# Patient Record
Sex: MALE | Race: OTHER | HISPANIC OR LATINO | URBAN - METROPOLITAN AREA
[De-identification: names, ages, dates, MRNs, and addresses within clinical notes are randomized per-mention and may not be internally consistent; named-entity substitution may affect disease eponyms.]

---

## 2020-03-10 ENCOUNTER — EMERGENCY (EMERGENCY)
Facility: HOSPITAL | Age: 25
LOS: 0 days | Discharge: HOME | End: 2020-03-10
Attending: EMERGENCY MEDICINE
Payer: OTHER MISCELLANEOUS

## 2020-03-10 VITALS
RESPIRATION RATE: 18 BRPM | DIASTOLIC BLOOD PRESSURE: 89 MMHG | HEART RATE: 75 BPM | OXYGEN SATURATION: 98 % | SYSTOLIC BLOOD PRESSURE: 129 MMHG | TEMPERATURE: 98 F

## 2020-03-10 VITALS — HEIGHT: 69 IN | WEIGHT: 169.98 LBS

## 2020-03-10 PROCEDURE — 99282 EMERGENCY DEPT VISIT SF MDM: CPT

## 2020-03-10 PROCEDURE — 90471 IMMUNIZATION ADMIN: CPT

## 2020-03-10 PROCEDURE — 99283 EMERGENCY DEPT VISIT LOW MDM: CPT | Mod: 25

## 2020-03-10 PROCEDURE — 90715 TDAP VACCINE 7 YRS/> IM: CPT

## 2020-03-10 RX ORDER — CEPHALEXIN 500 MG
500 CAPSULE ORAL ONCE
Refills: 0 | Status: COMPLETED | OUTPATIENT
Start: 2020-03-10 | End: 2020-03-10

## 2020-03-10 RX ORDER — TETANUS TOXOID, REDUCED DIPHTHERIA TOXOID AND ACELLULAR PERTUSSIS VACCINE, ADSORBED 5; 2.5; 8; 8; 2.5 [IU]/.5ML; [IU]/.5ML; UG/.5ML; UG/.5ML; UG/.5ML
0.5 SUSPENSION INTRAMUSCULAR ONCE
Refills: 0 | Status: COMPLETED | OUTPATIENT
Start: 2020-03-10 | End: 2020-03-10

## 2020-03-10 RX ORDER — CEPHALEXIN 500 MG
1 CAPSULE ORAL
Qty: 21 | Refills: 0
Start: 2020-03-10 | End: 2020-03-16

## 2020-03-10 RX ADMIN — Medication 500 MILLIGRAM(S): at 21:29

## 2020-03-10 RX ADMIN — TETANUS TOXOID, REDUCED DIPHTHERIA TOXOID AND ACELLULAR PERTUSSIS VACCINE, ADSORBED 0.5 MILLILITER(S): 5; 2.5; 8; 8; 2.5 SUSPENSION INTRAMUSCULAR at 21:29

## 2020-03-10 NOTE — ED ADULT NURSE NOTE - CAS TRG GENERAL NORM CIRC DET
Strong peripheral pulses
Patient did have ischemic KEG changes in setting of hypotension after prolonged asystole , possible due to demand ischemia doubt ACS ?? Normal ventricular systolic function ,  would give ecotrin rectally

## 2020-03-10 NOTE — ED ADULT NURSE NOTE - CHPI ED NUR SYMPTOMS NEG
no vomiting/no blood in mucus/no purulent drainage/no rectal pain/no redness/no fever/no pain/no drainage/no chills/no bleeding at site

## 2020-03-10 NOTE — ED STATDOCS - CARE PLAN
Principal Discharge DX:	Laceration of left thumb with damage to nail, foreign body presence unspecified, initial encounter

## 2020-03-10 NOTE — ED STATDOCS - PATIENT PORTAL LINK FT
You can access the FollowMyHealth Patient Portal offered by [facility] by registering at the following website: [email or web address]. By joining [employer]’s FollowMyHealth portal, you will also be able to view your health information using other applications (apps) compatible with our system.

## 2020-03-10 NOTE — ED STATDOCS - NSFOLLOWUPINSTRUCTIONS_ED_ALL_ED_FT
Laceration    WHAT YOU NEED TO KNOW:    A laceration is an injury to the skin and the soft tissue underneath it. Lacerations happen when you are cut or hit by something. They can happen anywhere on the body.     DISCHARGE INSTRUCTIONS:    Return to the emergency department if:     You have heavy bleeding or bleeding that does not stop after 10 minutes of holding firm, direct pressure over the wound.       Your wound opens up.     Contact your healthcare provider if:     You have a fever or chills.       Your laceration is red, warm, or swollen.      You have red streaks on your skin coming from your wound.      You have white or yellow drainage from the wound that smells bad.      You have pain that gets worse, even after treatment.       You have questions or concerns about your condition or care.     Medicines:     Prescription pain medicine may be given. Ask how to take this medicine safely.       Antibiotics help treat or prevent a bacterial infection.       Take your medicine as directed. Contact your healthcare provider if you think your medicine is not helping or if you have side effects. Tell him or her if you are allergic to any medicine. Keep a list of the medicines, vitamins, and herbs you take. Include the amounts, and when and why you take them. Bring the list or the pill bottles to follow-up visits. Carry your medicine list with you in case of an emergency.    Care for your wound as directed:     Do not get your wound wet until your healthcare provider says it is okay. Do not soak your wound in water. Do not go swimming until your healthcare provider says it is okay. Carefully wash the wound with soap and water. Gently pat the area dry or allow it to air dry.       Change your bandages when they get wet, dirty, or after washing. Apply new, clean bandages as directed. Do not apply elastic bandages or tape too tight. Do not put powders or lotions over your incision.       Apply antibiotic ointment as directed. Your healthcare provider may give you antibiotic ointment to put over your wound if you have stitches.      Check your wound every day for signs of infection such as swelling, redness, or pus.     Self-care:     Apply ice on your wound for 15 to 20 minutes every hour or as directed. Use an ice pack, or put crushed ice in a plastic bag. Cover it with a towel. Ice helps prevent tissue damage and decreases swelling and pain.      Follow up with your healthcare provider as directed: You may need to follow up in 24 to 48 hours to have your wound checked for infection. You will need to return in 3 to 14 days if you have stitches or staples so they can be removed. Care for your wound as directed to prevent infection and help it heal. Write down your questions so you remember to ask them during your visits.

## 2020-03-10 NOTE — ED STATDOCS - ATTENDING CONTRIBUTION TO CARE
I, Manuel Valadez, performed the initial face to face bedside interview with this patient regarding history of present illness, review of symptoms and relevant past medical, social and family history.  I completed an independent physical examination.  I was the initial provider who evaluated this patient. I have signed out the follow up of any pending tests (i.e. labs, radiological studies) to the ACP.  I have communicated the patient’s plan of care and disposition with the ACP.  The history, relevant review of systems, past medical and surgical history, medical decision making, and physical examination was documented by the scribe in my presence and I attest to the accuracy of the documentation.

## 2020-03-10 NOTE — ED ADULT NURSE NOTE - OBJECTIVE STATEMENT
23 y/o male with no significant PMHx presents to the ED c/o laceration left thumb. As per friend at bedside, pt was at work yesterday when he accidentally cut himself with a knife at 3pm. Last Tdap unknown. No other complaints at this time

## 2020-03-10 NOTE — ED STATDOCS - SKIN, MLM
skin normal color for race, warm, dry. 2.5cm C shaped laceration to dorsal side of left thumb through nailbed.

## 2020-03-10 NOTE — ED STATDOCS - PROGRESS NOTE DETAILS
23 yo male presents with no significant PMH presents with L to the L thumb s/p cutting himself while at work around 3pm. Pt did not come in sooner because he was afraid to cause problems at work. Last tdap unknown. Laceration is over 24 hours old that involved part of the nail. No active bleeding. Will clean out wound, not close the wound but apply bacitracin and wrap the wound. Will treat prophylactically with oral abx. -Michael Solano PA-C Wound irrigated with NS and betadine. Applied bacitracin and wrapped with gauze. Pt to be d/c home. Strict return precautions given. -Michael Solano PA-C

## 2020-03-10 NOTE — ED ADULT TRIAGE NOTE - CHIEF COMPLAINT QUOTE
pt c/o laceration to the L. thumb while at work yesterday. unknown tetanus status. denies pain but reports numbness to the L. thumb

## 2020-03-12 DIAGNOSIS — S61.112A LACERATION WITHOUT FOREIGN BODY OF LEFT THUMB WITH DAMAGE TO NAIL, INITIAL ENCOUNTER: ICD-10-CM
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
## 2020-03-12 DIAGNOSIS — Z23 ENCOUNTER FOR IMMUNIZATION: ICD-10-CM
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
## 2023-04-12 ENCOUNTER — EMERGENCY (EMERGENCY)
Facility: HOSPITAL | Age: 28
LOS: 0 days | Discharge: HOME | End: 2023-04-13
Attending: STUDENT IN AN ORGANIZED HEALTH CARE EDUCATION/TRAINING PROGRAM
Payer: MEDICAID

## 2023-04-12 ENCOUNTER — EMERGENCY (EMERGENCY)
Facility: HOSPITAL | Age: 28
LOS: 0 days | Discharge: HOME | End: 2023-04-12
Attending: EMERGENCY MEDICINE
Payer: MEDICAID

## 2023-04-12 VITALS
DIASTOLIC BLOOD PRESSURE: 89 MMHG | SYSTOLIC BLOOD PRESSURE: 137 MMHG | TEMPERATURE: 98 F | RESPIRATION RATE: 16 BRPM | OXYGEN SATURATION: 97 % | HEART RATE: 100 BPM

## 2023-04-12 VITALS — HEIGHT: 66 IN | WEIGHT: 149.91 LBS

## 2023-04-12 VITALS — WEIGHT: 149.91 LBS | HEIGHT: 66 IN
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
## 2023-04-12 DIAGNOSIS — R10.813 RIGHT LOWER QUADRANT ABDOMINAL TENDERNESS: ICD-10-CM

## 2023-04-12 DIAGNOSIS — F10.10 ALCOHOL ABUSE, UNCOMPLICATED: ICD-10-CM

## 2023-04-12 DIAGNOSIS — R10.814 LEFT LOWER QUADRANT ABDOMINAL TENDERNESS: ICD-10-CM
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
## 2023-04-12 DIAGNOSIS — S22.42XA MULTIPLE FRACTURES OF RIBS, LEFT SIDE, INITIAL ENCOUNTER FOR CLOSED FRACTURE: ICD-10-CM

## 2023-04-12 DIAGNOSIS — R07.81 PLEURODYNIA: ICD-10-CM

## 2023-04-12 DIAGNOSIS — R06.6 HICCOUGH: ICD-10-CM

## 2023-04-12 LAB
ADD ON TEST-SPECIMEN IN LAB: SIGNIFICANT CHANGE UP
ADD ON TEST-SPECIMEN IN LAB: SIGNIFICANT CHANGE UP
ALBUMIN SERPL ELPH-MCNC: 3.9 G/DL — SIGNIFICANT CHANGE UP (ref 3.3–5)
ALP SERPL-CCNC: 68 U/L — SIGNIFICANT CHANGE UP (ref 40–120)
ALT FLD-CCNC: 36 U/L — SIGNIFICANT CHANGE UP (ref 12–78)
ANION GAP SERPL CALC-SCNC: 5 MMOL/L — SIGNIFICANT CHANGE UP (ref 5–17)
APPEARANCE UR: CLEAR — SIGNIFICANT CHANGE UP
AST SERPL-CCNC: 24 U/L — SIGNIFICANT CHANGE UP (ref 15–37)
BASOPHILS # BLD AUTO: 0.06 K/UL — SIGNIFICANT CHANGE UP (ref 0–0.2)
BASOPHILS NFR BLD AUTO: 0.6 % — SIGNIFICANT CHANGE UP (ref 0–2)
BILIRUB SERPL-MCNC: 0.5 MG/DL — SIGNIFICANT CHANGE UP (ref 0.2–1.2)
BILIRUB UR-MCNC: NEGATIVE — SIGNIFICANT CHANGE UP
BUN SERPL-MCNC: 4 MG/DL — LOW (ref 7–23)
CALCIUM SERPL-MCNC: 9.5 MG/DL — SIGNIFICANT CHANGE UP (ref 8.5–10.1)
CHLORIDE SERPL-SCNC: 104 MMOL/L — SIGNIFICANT CHANGE UP (ref 96–108)
CO2 SERPL-SCNC: 28 MMOL/L — SIGNIFICANT CHANGE UP (ref 22–31)
COLOR SPEC: YELLOW — SIGNIFICANT CHANGE UP
CREAT SERPL-MCNC: 0.56 MG/DL — SIGNIFICANT CHANGE UP (ref 0.5–1.3)
DIFF PNL FLD: NEGATIVE — SIGNIFICANT CHANGE UP
EGFR: 137 ML/MIN/1.73M2 — SIGNIFICANT CHANGE UP
EOSINOPHIL # BLD AUTO: 0.15 K/UL — SIGNIFICANT CHANGE UP (ref 0–0.5)
EOSINOPHIL NFR BLD AUTO: 1.5 % — SIGNIFICANT CHANGE UP (ref 0–6)
ETHANOL SERPL-MCNC: <10 MG/DL — SIGNIFICANT CHANGE UP (ref 0–10)
GLUCOSE SERPL-MCNC: 95 MG/DL — SIGNIFICANT CHANGE UP (ref 70–99)
GLUCOSE UR QL: NEGATIVE — SIGNIFICANT CHANGE UP
HCT VFR BLD CALC: 46.4 % — SIGNIFICANT CHANGE UP (ref 39–50)
HGB BLD-MCNC: 16.4 G/DL — SIGNIFICANT CHANGE UP (ref 13–17)
IMM GRANULOCYTES NFR BLD AUTO: 0.3 % — SIGNIFICANT CHANGE UP (ref 0–0.9)
KETONES UR-MCNC: NEGATIVE — SIGNIFICANT CHANGE UP
LEUKOCYTE ESTERASE UR-ACNC: NEGATIVE — SIGNIFICANT CHANGE UP
LYMPHOCYTES # BLD AUTO: 1.08 K/UL — SIGNIFICANT CHANGE UP (ref 1–3.3)
LYMPHOCYTES # BLD AUTO: 10.9 % — LOW (ref 13–44)
MAGNESIUM SERPL-MCNC: 2.4 MG/DL — SIGNIFICANT CHANGE UP (ref 1.6–2.6)
MCHC RBC-ENTMCNC: 33.1 PG — SIGNIFICANT CHANGE UP (ref 27–34)
MCHC RBC-ENTMCNC: 35.3 GM/DL — SIGNIFICANT CHANGE UP (ref 32–36)
MCV RBC AUTO: 93.7 FL — SIGNIFICANT CHANGE UP (ref 80–100)
MONOCYTES # BLD AUTO: 0.85 K/UL — SIGNIFICANT CHANGE UP (ref 0–0.9)
MONOCYTES NFR BLD AUTO: 8.6 % — SIGNIFICANT CHANGE UP (ref 2–14)
NEUTROPHILS # BLD AUTO: 7.77 K/UL — HIGH (ref 1.8–7.4)
NEUTROPHILS NFR BLD AUTO: 78.1 % — HIGH (ref 43–77)
NITRITE UR-MCNC: NEGATIVE — SIGNIFICANT CHANGE UP
PH UR: 7 — SIGNIFICANT CHANGE UP (ref 5–8)
PLATELET # BLD AUTO: 290 K/UL — SIGNIFICANT CHANGE UP (ref 150–400)
POTASSIUM SERPL-MCNC: 3.8 MMOL/L — SIGNIFICANT CHANGE UP (ref 3.5–5.3)
POTASSIUM SERPL-SCNC: 3.8 MMOL/L — SIGNIFICANT CHANGE UP (ref 3.5–5.3)
PROT SERPL-MCNC: 8.2 GM/DL — SIGNIFICANT CHANGE UP (ref 6–8.3)
PROT UR-MCNC: NEGATIVE — SIGNIFICANT CHANGE UP
RBC # BLD: 4.95 M/UL — SIGNIFICANT CHANGE UP (ref 4.2–5.8)
RBC # FLD: 12.3 % — SIGNIFICANT CHANGE UP (ref 10.3–14.5)
SODIUM SERPL-SCNC: 137 MMOL/L — SIGNIFICANT CHANGE UP (ref 135–145)
SP GR SPEC: 1.01 — SIGNIFICANT CHANGE UP (ref 1.01–1.02)
UROBILINOGEN FLD QL: NEGATIVE — SIGNIFICANT CHANGE UP
WBC # BLD: 9.94 K/UL — SIGNIFICANT CHANGE UP (ref 3.8–10.5)
WBC # FLD AUTO: 9.94 K/UL — SIGNIFICANT CHANGE UP (ref 3.8–10.5)

## 2023-04-12 PROCEDURE — 74177 CT ABD & PELVIS W/CONTRAST: CPT | Mod: 26,MA

## 2023-04-12 PROCEDURE — 99285 EMERGENCY DEPT VISIT HI MDM: CPT | Mod: 25

## 2023-04-12 PROCEDURE — 86901 BLOOD TYPING SEROLOGIC RH(D): CPT

## 2023-04-12 PROCEDURE — 96374 THER/PROPH/DIAG INJ IV PUSH: CPT | Mod: XU

## 2023-04-12 PROCEDURE — 71045 X-RAY EXAM CHEST 1 VIEW: CPT | Mod: 26

## 2023-04-12 PROCEDURE — 74177 CT ABD & PELVIS W/CONTRAST: CPT | Mod: MA

## 2023-04-12 PROCEDURE — 81003 URINALYSIS AUTO W/O SCOPE: CPT

## 2023-04-12 PROCEDURE — 85025 COMPLETE CBC W/AUTO DIFF WBC: CPT

## 2023-04-12 PROCEDURE — 99408 AUDIT/DAST 15-30 MIN: CPT

## 2023-04-12 PROCEDURE — 80053 COMPREHEN METABOLIC PANEL: CPT

## 2023-04-12 PROCEDURE — 86850 RBC ANTIBODY SCREEN: CPT

## 2023-04-12 PROCEDURE — 93010 ELECTROCARDIOGRAM REPORT: CPT

## 2023-04-12 PROCEDURE — 71045 X-RAY EXAM CHEST 1 VIEW: CPT

## 2023-04-12 PROCEDURE — 71260 CT THORAX DX C+: CPT | Mod: 26,MA

## 2023-04-12 PROCEDURE — 99285 EMERGENCY DEPT VISIT HI MDM: CPT

## 2023-04-12 PROCEDURE — 84484 ASSAY OF TROPONIN QUANT: CPT

## 2023-04-12 PROCEDURE — 93005 ELECTROCARDIOGRAM TRACING: CPT

## 2023-04-12 PROCEDURE — 86900 BLOOD TYPING SEROLOGIC ABO: CPT

## 2023-04-12 PROCEDURE — 71260 CT THORAX DX C+: CPT | Mod: MA

## 2023-04-12 PROCEDURE — 96375 TX/PRO/DX INJ NEW DRUG ADDON: CPT | Mod: XU

## 2023-04-12 PROCEDURE — 83690 ASSAY OF LIPASE: CPT

## 2023-04-12 PROCEDURE — 83735 ASSAY OF MAGNESIUM: CPT

## 2023-04-12 PROCEDURE — 80307 DRUG TEST PRSMV CHEM ANLYZR: CPT

## 2023-04-12 RX ORDER — ONDANSETRON 8 MG/1
4 TABLET, FILM COATED ORAL ONCE
Refills: 0 | Status: COMPLETED | OUTPATIENT
Start: 2023-04-12 | End: 2023-04-12

## 2023-04-12 RX ORDER — FAMOTIDINE 10 MG/ML
20 INJECTION INTRAVENOUS ONCE
Refills: 0 | Status: COMPLETED | OUTPATIENT
Start: 2023-04-12 | End: 2023-04-12

## 2023-04-12 RX ORDER — SODIUM CHLORIDE 9 MG/ML
1000 INJECTION INTRAMUSCULAR; INTRAVENOUS; SUBCUTANEOUS ONCE
Refills: 0 | Status: COMPLETED | OUTPATIENT
Start: 2023-04-12 | End: 2023-04-12

## 2023-04-12 RX ADMIN — SODIUM CHLORIDE 1000 MILLILITER(S): 9 INJECTION INTRAMUSCULAR; INTRAVENOUS; SUBCUTANEOUS at 15:38

## 2023-04-12 RX ADMIN — ONDANSETRON 4 MILLIGRAM(S): 8 TABLET, FILM COATED ORAL at 15:23

## 2023-04-12 RX ADMIN — SODIUM CHLORIDE 1000 MILLILITER(S): 9 INJECTION INTRAMUSCULAR; INTRAVENOUS; SUBCUTANEOUS at 15:20

## 2023-04-12 RX ADMIN — FAMOTIDINE 20 MILLIGRAM(S): 10 INJECTION INTRAVENOUS at 15:23

## 2023-04-12 RX ADMIN — Medication 1 MILLIGRAM(S): at 13:47

## 2023-04-12 NOTE — ED PROVIDER NOTE - OBJECTIVE STATEMENT
29 y/o male PMHx daily EtOH abuse, drinks 10-12 beers daily for 6 years presents to the ED c/o constant left rib pain x5 days also with abdominal tenderness. No trauma or falls. +hiccups "for a while." Last EtOH use yesterday (20 beers). Pt reports he wants to quit drinking. Pt has never been to rehab or been treated for EtOH abuse. No chest pain, no SOB. No other complaints at this time. 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

## 2023-04-12 NOTE — ED PROVIDER NOTE - NSFOLLOWUPINSTRUCTIONS_ED_ALL_ED_FT
Fractura de colin  Rib Fracture    Dorene fractura de colin es la ruptura de faiza de los huesos que la shital. Las costillas son huesos largos y curvos que rodean el pecho y están unidos a la columna vertebral y al esternón. Protegen al corazón, los pulmones y otros órganos que se encuentran en el pecho.    Dorene fractura o fisura de colin puede ser dolorosa, ami no suele ser grave. La mayoría de las fracturas de costillas se curan solas luego de un tiempo. Sin embargo, pueden llegar a ser más graves si se rompen varias costillas o si se desplazan y empujan otras estructuras u órganos.    ¿Cuáles son las causas?  Las causas de esta afección son:  Los movimientos repetitivos que requieren mucha fuerza, frank lanzar dorene pelota en el béisbol o tener episodios de tos muy sunita.  Un golpe directo en el pecho, frank dorene lesión deportiva, un accidente automovilístico o dorene caída.  Cáncer que se extendió a los huesos, lo que puede debilitarlos y provocarles fracturas.  ¿Cuáles son los signos o síntomas?  Los síntomas de esta afección incluyen:  Dolor al inspirar o al toser.  Dolor cuando alguien presiona la favian lesionada.  Falta de aire.  ¿Cómo se diagnostica?  Esta afección se diagnostica mediante un examen físico y los antecedentes médicos. Además, pueden hacerle estudios de diagnóstico por imágenes, por ejemplo:  Radiografía de tórax.  Exploración por tomografía computarizada (TC).  Resonancia magnética (RM).  Gammagrafía ósea.  Ecografía de tórax.  ¿Cómo se trata?  El tratamiento de esta afección depende de la gravedad de la fractura. La mayoría de las fracturas de costillas se curan solas en 1 a 3 meses. La curación puede tardar más tiempo si tiene tos o si realiza actividades que empeoran la lesión. Le podrán carlo analgésicos para controlar el dolor sridevi el proceso de curación. Le enseñarán a realizar ejercicios de respiración profunda.    En el serafin de las lesiones graves, es posible que deba ser hospitalizado o someterse a dorene cirugía.    Siga estas instrucciones en sarabia casa:  Control del dolor, la rigidez y la hinchazón    Si se lo indican, aplique hielo sobre la favian de la lesión. Para hacer esto:  Ponga el hielo en dorene bolsa plástica.  Coloque dorene toalla entre la piel y la bolsa.  Aplique el hielo sridevi 20 minutos, 2 o 3 veces por día.  Retire el hielo si la piel se pone de color schmid brillante. Gully es muy importante. Si no puede sentir dolor, calor o frío, tiene un mayor riesgo de que se dañe la favian.  Use los medicamentos de venta serafin y los recetados solamente frank se lo haya indicado el médico.  Actividad    Evitar realizar actividades o movimientos que causen dolor. Realice las actividades con cuidado y evite golpearse la colin lesionada.  Aumente la cantidad de actividades que realice de forma gradual frank se lo haya indicado el médico.  Instrucciones generales    Rebecca ejercicios de respiración profunda frank se lo haya indicado el médico. Gully ayudará a evitar la neumonía, que es dorene complicación frecuente de las fracturas de colin. El médico puede indicarle que rebecca lo siguiente:  Rebecca respiraciones profundas varias veces al día.  Trate de toser varias veces al día, con el área lesionada sostenida con dorene almohada.  Use un dispositivo llamado espirómetro de incentivo para realizar respiraciones profundas varias veces por día.  Beber suficiente líquido frank para mantener la orina de color amarillo pálido.  No use cinturones ni sujetadores. Esta limitación de la respiración puede causar neumonía.  Cumpla con todas las visitas de seguimiento. Gully es importante.  Comuníquese con un médico si:  Tiene fiebre.  Solicite ayuda de inmediato si:  Tiene dificultad para respirar o le falta el aire.  Tiene tos permanente o expectora un esputo espeso o con davi.  Tiene náuseas, vómitos o dolor abdominal.  El dolor empeora y los medicamentos no hacen efecto.  Estos síntomas pueden representar un problema grave que constituye dorene emergencia. No espere a yordy si los síntomas desaparecen. Solicite atención médica de inmediato. Comuníquese con el servicio de emergencias de sarabia localidad (911 en los [place]). No conduzca por zenobia propios medios hasta el hospital.    Resumen  Dorene fractura de colin es la ruptura de faiza de los huesos que la shital.  Dorene fractura o fisura de colin puede ser dolorosa, ami no suele ser grave.  La mayoría de las fracturas de costillas se curan solas luego de un tiempo.  El tratamiento de esta afección depende de la gravedad de la fractura.  Evitar realizar cualquier actividad o movimiento que cause dolor.  Esta información no tiene frank fin reemplazar el consejo del médico. Asegúrese de hacerle al médico cualquier pregunta que tenga.

## 2023-04-12 NOTE — ED STATDOCS - PROGRESS NOTE DETAILS
Chapito Rodriguez for attending Dr. He: 27 y/o male presents to the ED c/o left rib pain x5 days. No trauma or falls. +hiccups "for a while." Denies SOB. Daily EtOH use. Last EtOH use yesterday (20 beers). No other complaints at this time.  ID#: [id number]. Will send pt to main ED for further evaluation.

## 2023-04-12 NOTE — ED STATDOCS - NS_ATTENDINGSCRIBE_ED_ALL_ED
I personally performed the service described in the documentation recorded by the scribe in my presence, and it accurately and completely records my words and actions.

## 2023-04-12 NOTE — ED PROVIDER NOTE - PHYSICAL EXAMINATION
General: AAOx3, NAD  HEENT: NCAT  Cardiac: Normal rate and rhythm, no murmurs, normal peripheral perfusion  Respiratory: Normal rate and effort. CTAB  GI: Soft, nondistended. + b/l lower abdominal TTP  Neuro: No focal deficits. GAO equally x4, sensation to light touch intact throughout  MSK: FROMx4, no peripheral edema. + L lateral chest wall TTP.  Skin: No rash

## 2023-04-12 NOTE — ED PROVIDER NOTE - CLINICAL SUMMARY MEDICAL DECISION MAKING FREE TEXT BOX
Pt presenting with atraumatic L chest wall pain and lower abdominal tenderness. Plan: EKG, cardiac workup, CT chest/ab/pel. Given heavy alcohol use, need to r/o trauma. Pt is also interested in detox/rehab. This can be readdressed when remainder of medical workup is complete. Pt presenting with atraumatic L chest wall pain and lower abdominal tenderness. Plan: EKG, cardiac workup, CT chest/ab/pel. Given heavy alcohol use, need to r/o trauma. Pt is also interested in detox/rehab. This can be readdressed when remainder of medical workup is complete.    5:16pm- received s/o from Dr. Davey pending CT scan- CT scans with multiple rib fractures; seen by trauma and cleared for dc/ home; incentive spirometer given; patient consulted by SBIRT and refused detox; cleared for dc at home at time; ambulatory in ED with steady gait; strict return precautions given.

## 2023-04-12 NOTE — ED PROVIDER NOTE - OBJECTIVE STATEMENT
: #[id number] - Deandre   pt is a 27 y/o M w/ a PMHx of ETOH abuse (approximately 10-20 beers daily) recently seen today in the ER and found to have 3 nondisplaced rib fractures, evaluated by Trauma surgery and was discharged home with instructions for multimodal pain control and follow-up. pt was instructed to come back to the hospital if his pain is not controlled and to come back for hospitalization. Pt states that he is having continued and worsening pain but only took 2 Tylenol and used some heat packs for the pain without improvement. Pt also states that he also has been having hiccups. pt endorses last drink at midnight last night. No h/o withdrawals requiring hospitalization.

## 2023-04-12 NOTE — ED PROVIDER NOTE - PHYSICAL EXAMINATION
PHYSICAL EXAM:  GENERAL: in NAD, Sitting comfortable in bed, in no respiratory distress  HEAD: Atraumatic, no eastman's sign, no periorbital ecchymosis   EYES: PERRL, EOMs intact b/l w/out deficits  ENMT: Moist membranes, no anterior/posterior, or supraclavicular LAD  CHEST/LUNG: CTAB no wheezes/rhonchi/rales  HEART: RRR no murmur/gallops/rubs  ABDOMEN: +BS, soft, NT, ND  EXTREMITIES: No LE edema, +2 radial pulses b/l  NERVOUS SYSTEM:  A&Ox4, No motor deficits or sensory deficits to b/l UEs  Heme/LYMPH: No ecchymosis or bruising or LAD  SKIN:  No new rashes or DTIs PHYSICAL EXAM:  GENERAL: in NAD, Sitting comfortable in bed, in no respiratory distress  HEAD: Atraumatic, no eastman's sign, no periorbital ecchymosis   EYES: PERRL, EOMs intact b/l w/out deficits  ENMT: Moist membranes, no anterior/posterior, or supraclavicular LAD  CHEST/LUNG: CTAB no wheezes/rhonchi/rales; +TTP of left lower lateral ribs but no flail chest  HEART: RRR no murmur/gallops/rubs  ABDOMEN: +BS, soft, NT, ND  EXTREMITIES: No LE edema, +2 radial pulses b/l  NERVOUS SYSTEM:  A&Ox4, No motor deficits or sensory deficits to b/l UEs  Heme/LYMPH: No ecchymosis or bruising or LAD  SKIN:  No new rashes or DTIs

## 2023-04-12 NOTE — SBIRT NOTE ADULT - NSSBIRTDRGPOSREINDET_GEN_A_CORE
Services provided via Telephonic SBIRT line, [phone],  [id number] used. Positive reinforcement provided given patient currently within healthy guidelines. Education materials reviewed and given to patient.

## 2023-04-12 NOTE — SBIRT NOTE ADULT - NSSBIRTBRIEFINTDET_GEN_A_CORE
Services provided via Telephonic SBIRT line, [phone],  [id number] used. Screening results were reviewed with the patient and patient was provided information about healthy guidelines and potential negative consequences associated with level of risk. Motivation and readiness to reduce or stop use was discussed and goals and activities to make changes were suggested/offered. Patient is refusing referral to treatment, states he cannot take off work. HC discussed potential withdrawal symptoms to be aware of, and encouraged him to return to the ED if he needs assistance.

## 2023-04-12 NOTE — ED ADULT TRIAGE NOTE - CHIEF COMPLAINT QUOTE
Pt presents to ER c/o left sided rib/flank pain and generalized abd pain. Onset of symptoms began 5 days PTA. Denies injury/fall/CP/SOB. Pt reports he thinks it is r/t drinking alcohol; last drink was last night

## 2023-04-12 NOTE — ED PROVIDER NOTE - PATIENT PORTAL LINK FT
You can access the FollowMyHealth Patient Portal offered by [facility] by registering at the following website: [email or web address]. By joining [employer]’s FollowMyHealth portal, you will also be able to view your health information using other applications (apps) compatible with our system.

## 2023-04-12 NOTE — ED PROVIDER NOTE - NS ED ROS FT
Constitutional: No fevers, chills, or sweats.  Cardiac: No chest pain, exertional dyspnea, orthopnea  Respiratory: No shortness of breath, no cough  GI: No N/V/D. +abdominal tenderness  Neuro: No headaches, no neck pain/stiffness, no numbness  Msk: +rib pain  All other systems reviewed and are negative unless otherwise stated in the HPI.

## 2023-04-12 NOTE — ED PROVIDER NOTE - NS ED ROS FT
Constitutional: Denies fevers & chills  HEENT: Denies Rhinorrhea & sore throat  Cardiac: Denies LE yelena a  Pulmonary: Denies Shortness of breath & Cough  Abdomen: Denies Abdominal pain, N/V, blood in stools, diarrhea   : Denies dysuria & hematuria.  Skin: Denies Rash or itching  Neuro: Denies new visual changes, confusion, headaches, and one sided paralysis

## 2023-04-12 NOTE — CONSULT NOTE ADULT - ASSESSMENT
A 28 year old male with nondisplaced rib fractures  No evidence of hemo- or pneumothorax  Patient breathing well on room air    Plan:  Multimodal analgesia, Tylenol, NSAID, Lidocaine patch, Oxycodone PRN  Incentive spirometry  Patient can be discharged home    Plan discussed with Dr Holguin

## 2023-04-12 NOTE — CONSULT NOTE ADULT - SUBJECTIVE AND OBJECTIVE BOX
A 28 year old male who was seen in the ED for upper abdominal and lower chest pain for the last 5 days. Patient claims that his pain started around 5 days ago, felt across the abdomen, sharp, increasing in severity over time. Patient also describes nausea and had 2 episodes of vomiting overnight which made his pain worse. He denies any history of trauma or exercise. No history of similar pain in the past. He denies any cough, sputum, shortness of breath or pain anywhere else in his body.    PMH: Neg  PSH: Neg  Medications: none  Allergies: NKDA  Social: Drinks every day 10-12 beers    Physical:   Afebrile, stable v/s  Alert, conscious, oriented  Not in pain or respiratory distress  No pallor, jaundice or cyanosis  Chest clear, equal air entry bilaterally. Tenderness to palpation over the left lower chest. No crepitations  Abdomen: Soft, not distended, no masses. Tenderness over the right upper abdomen more than left side.  Extremities: Normal                          16.4   9.94  )-----------( 290      ( 12 Apr 2023 13:29 )             46.4   04-12    137  |  104  |  4<L>  ----------------------------<  95  3.8   |  28  |  0.56    Ca    9.5      12 Apr 2023 13:29  Mg     2.4     04-12    TPro  8.2  /  Alb  3.9  /  TBili  0.5  /  DBili  x   /  AST  24  /  ALT  36  /  AlkPhos  68  04-12  < from: CT Abdomen and Pelvis w/ IV Cont (04.12.23 @ 14:58) >  1. Acute lateral left sixth, seventh, and eighth rib fractures.  2. No pneumothorax.  3. No findings suspicious for acute intra-abdominal visceral injury.    < end of copied text >

## 2023-04-12 NOTE — ED PROVIDER NOTE - PATIENT PORTAL LINK FT
You can access the FollowMyHealth Patient Portal offered by [facility] by registering at the following website: [email or web address]. By joining [employer]’s FollowMyHealth portal, you will also be able to view your health information using other applications (apps) compatible with our system.

## 2023-04-12 NOTE — ED PROVIDER NOTE - PROGRESS NOTE DETAILS
Patient was offered inpatient detox resources by social work but declined.  Patient will receive information on outpatient detoxification resources.  Patient has been cleared for discharge.  Manuel Valadez, DO

## 2023-04-12 NOTE — ED ADULT NURSE NOTE - OBJECTIVE STATEMENT
Pt is a 28 YOM complaining of rib pain and hiccups. Pt states "I drink 12 beers a day my ribs hurt and I cant stop hiccuping."  Pt is GCS 15, HEENT clear, PERRL, PMS x4, A & O x4. Pt reports no medical hx. 20 g IV inserted labs drawn medications given. Pt safety is maintained bed in lowest position semi fowlers with bed rails up.

## 2023-04-12 NOTE — ED PROVIDER NOTE - CLINICAL SUMMARY MEDICAL DECISION MAKING FREE TEXT BOX
Pt is a 27 y/o M w/ a PMHx of ETOH abuse (approximately 10-20 beers daily) recently seen today in the ER and found to have 3 nondisplaced rib fractures, evaluated by Trauma surgery and was discharged home with instructions for multimodal pain control and follow-up Pt is a 29 y/o M w/ a PMHx of ETOH abuse (approximately 10-20 beers daily) recently seen today in the ER and found to have 3 nondisplaced rib fractures, evaluated by Trauma surgery and was discharged home with instructions for multimodal pain control and follow-up who p/w worsening pain in the setting of non-adherence to pain control regimen previously cleared by trauma surgery for discharge. Pt also with mild withdrawal with slight tremulousness and tongue fasciculations. Will check CBC to ensure H/H stable, CMP to check kidney and liver function, give Librium Q6H and PRN ativan PO for withdrawal symptoms. Discussed with patient and mother at bedside (with  above) and patient desires an inpatient detox. Will check CXR and if no PTX will control pain with NSAIDs and lidocaine patch and treat hiccups with Thorazine and gabapentin and then will see SW in AM for transfer to in pt detox as patient with CIWA scores of 1-2 currently.

## 2023-04-12 NOTE — ED PROVIDER NOTE - NSFOLLOWUPINSTRUCTIONS_ED_ALL_ED_FT
Intoxicación alcohólica  Alcohol Intoxication    La intoxicación alcohólica ocurre cuando dorene persona ya no piensa con claridad ni se desempeña miguel (experimenta un deterioro) después de beber bebidas alcohólicas. La intoxicación alcohólica puede ocurrir incluso con dorene copa. El nivel de deterioro depende de lo siguiente:    La cantidad de alcohol que la persona tomó.  La edad, el sexo y el peso de la persona.  La frecuencia con la que la persona cornell.  Si la persona tiene otras enfermedades, tales frank diabetes, convulsiones o dorene afección cardíaca.    La intoxicación alcohólica puede variar en gravedad, desde leve hasta grave. La afección puede ser peligrosa, especialmente cuando se beben grandes cantidades de alcohol en un corto período de tiempo (borrachera) o si la persona también tomó medicamentos recetados o consumió drogas recreativas.    ¿Cuáles son los signos o los síntomas?  Los síntomas de intoxicación alcohólica leve incluyen los siguientes:    Sensación de relajación o somnolencia.  Leve dificultad con:  La coordinación.  El habla.  La memoria.  La atención.    Los síntomas de intoxicación alcohólica moderada incluyen los siguientes:    Emociones extremas, frank enojo o tristeza.  Dificultad moderada con:  La coordinación.  El habla.  La memoria.  La atención.    Los síntomas de intoxicación alcohólica grave incluyen los siguientes:    Desmayo.  Vómitos.  Confusión.  Respiración lenta.  Coma.  Dificultad grave con:  La coordinación.  El habla.  La memoria.  La atención.    La intoxicación alcohólica, especialmente en personas que no están expuestas al alcohol, con frecuencia puede avanzar de leve a grave rápidamente y hasta puede causar coma o la muerte.    ¿Cómo se diagnostica?  Esta afección se puede diagnosticar en función de lo siguiente:    Los antecedentes médicos.  Un examen físico.  Un análisis de davi que mide la concentración de alcohol en la davi (blood alcohol content, JORGE).  Si hay olor a alcohol en la respiración.    Sarabia médico le preguntará la cantidad y el tipo de bebida alcohólica que bebió.    ¿Cómo se trata?  Generalmente, no se requiere un tratamiento para esta afección. La mayoría de los efectos del alcohol son temporales y desaparecen a medida que el alcohol fauzia el cuerpo de forma natural. El médico puede recomendar un monitoreo hasta que el nivel de alcohol comience a bajar y sea seguro volver a casa. También es posible que le administren líquidos a través de dorene vía intravenosa (IV) para ayudar a evitar la deshidratación. Si la intoxicación alcohólica es grave, es posible que se necesite dorene máquina para respirar denominada "ventilador" para ayudarle a respirar.    Siga estas instrucciones en sarabia casa:  No conduzca vehículos después de beber alcohol.  Pídale a alguna persona que se quede con usted mientras está embriagado. No debe quedarse solo.  Manténgase hidratado. Va suficiente líquido para mantener la orina lu o de color amarillo pálido.  Evite la cafeína ya que puede deshidratarlo.  [place] los medicamentos de venta serafin y los recetados solamente frank se lo haya indicado el médico.     ¿Cómo se gladis?  Para evitar la intoxicación alcohólica:    Limite el consumo de alcohol a no más de 1 medida por día si es dinora y no está embarazada y a 2 medidas por día si es hombre. Dorene medida equivale a 12 onzas de cerveza, 5 onzas de vino o 1½ onzas de bebidas alcohólicas de anabella graduación.  No va alcohol con el estómago vacío.  Evite el consumo de alcohol si:  No tiene la edad legal para beber.  Está embarazada o podría estarlo.  Está tomando medicamentos que no se deben mikki con alcohol.  Beber empeora sarabia enfermedad.  Tiene que conducir o realizar actividades que requieren sarabia atención.  Tiene un trastorno por abuso de sustancias.    Para evitar las complicaciones posiblemente graves de la intoxicación alcohólica, busque atención médica de inmediato si usted o dorene persona que conoce tiene signos de intoxicación alcohólica moderada o grave. Estos incluyen los siguientes:    Dificultad moderada o grave con:  La coordinación.  El habla.  La memoria.  La atención.  Desmayo.  Confusión.  Vómitos.    No deje a dorene persona filomena si está embriagada.    Comuníquese con un médico si:  No mejora luego de algunos días.  Tiene problemas en el trabajo, la escuela o en sarabia casa debido al consumo de alcohol.    Solicite ayuda de inmediato si:  Se siente inestable cuando intenta abandonar el consumo de alcohol.  Comienza a temblar de manera incontrolable (tiene convulsiones).  Vomita davi. La davi en el vómito puede ser de color schmid brillante o similar a los granos de café.  Observa davi en la materia fecal. La davi en la materia fecal puede ser de color schmid brillante o hacer que la materia fecal se miracle de color nino alquitranado y huela mal.  Se siente mareado o se desmaya.    Si alguna vez siente que puede lastimarse o lastimar a los demás, o tiene pensamientos de poner fin a sarabia gabrielle, busque ayuda de inmediato. Puede dirigirse al servicio de urgencias más cercano o comunicarse con:    El servicio de emergencias de sarabia localidad (911 en los [place]).  Dorene línea de asistencia al suicida y atención en crisis, frank la Línea Nacional de Prevención del Suicidio (National Suicide Prevention Lifeline) al [phone]. Está disponible las 24 horas del día.

## 2023-04-13 VITALS
DIASTOLIC BLOOD PRESSURE: 68 MMHG | RESPIRATION RATE: 16 BRPM | SYSTOLIC BLOOD PRESSURE: 120 MMHG | TEMPERATURE: 98 F | OXYGEN SATURATION: 97 % | HEART RATE: 92 BPM

## 2023-04-13 DIAGNOSIS — F17.210 NICOTINE DEPENDENCE, CIGARETTES, UNCOMPLICATED: ICD-10-CM

## 2023-04-13 DIAGNOSIS — R07.81 PLEURODYNIA: ICD-10-CM

## 2023-04-13 DIAGNOSIS — S22.42XA MULTIPLE FRACTURES OF RIBS, LEFT SIDE, INITIAL ENCOUNTER FOR CLOSED FRACTURE: ICD-10-CM
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
## 2023-04-13 DIAGNOSIS — Z86.59 PERSONAL HISTORY OF OTHER MENTAL AND BEHAVIORAL DISORDERS: ICD-10-CM
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
## 2023-04-13 LAB
ALBUMIN SERPL ELPH-MCNC: 3.7 G/DL — SIGNIFICANT CHANGE UP (ref 3.3–5)
ALP SERPL-CCNC: 68 U/L — SIGNIFICANT CHANGE UP (ref 40–120)
ALT FLD-CCNC: 30 U/L — SIGNIFICANT CHANGE UP (ref 12–78)
ANION GAP SERPL CALC-SCNC: 2 MMOL/L — LOW (ref 5–17)
AST SERPL-CCNC: 19 U/L — SIGNIFICANT CHANGE UP (ref 15–37)
BASOPHILS # BLD AUTO: 0.09 K/UL — SIGNIFICANT CHANGE UP (ref 0–0.2)
BASOPHILS NFR BLD AUTO: 0.7 % — SIGNIFICANT CHANGE UP (ref 0–2)
BILIRUB SERPL-MCNC: 0.9 MG/DL — SIGNIFICANT CHANGE UP (ref 0.2–1.2)
BUN SERPL-MCNC: 5 MG/DL — LOW (ref 7–23)
CALCIUM SERPL-MCNC: 9.1 MG/DL — SIGNIFICANT CHANGE UP (ref 8.5–10.1)
CHLORIDE SERPL-SCNC: 104 MMOL/L — SIGNIFICANT CHANGE UP (ref 96–108)
CO2 SERPL-SCNC: 31 MMOL/L — SIGNIFICANT CHANGE UP (ref 22–31)
CREAT SERPL-MCNC: 0.64 MG/DL — SIGNIFICANT CHANGE UP (ref 0.5–1.3)
EGFR: 131 ML/MIN/1.73M2 — SIGNIFICANT CHANGE UP
EOSINOPHIL # BLD AUTO: 0.29 K/UL — SIGNIFICANT CHANGE UP (ref 0–0.5)
EOSINOPHIL NFR BLD AUTO: 2.2 % — SIGNIFICANT CHANGE UP (ref 0–6)
GLUCOSE SERPL-MCNC: 106 MG/DL — HIGH (ref 70–99)
HCT VFR BLD CALC: 45.3 % — SIGNIFICANT CHANGE UP (ref 39–50)
HGB BLD-MCNC: 15.8 G/DL — SIGNIFICANT CHANGE UP (ref 13–17)
IMM GRANULOCYTES NFR BLD AUTO: 0.2 % — SIGNIFICANT CHANGE UP (ref 0–0.9)
LYMPHOCYTES # BLD AUTO: 1.46 K/UL — SIGNIFICANT CHANGE UP (ref 1–3.3)
LYMPHOCYTES # BLD AUTO: 10.9 % — LOW (ref 13–44)
MCHC RBC-ENTMCNC: 33 PG — SIGNIFICANT CHANGE UP (ref 27–34)
MCHC RBC-ENTMCNC: 34.9 GM/DL — SIGNIFICANT CHANGE UP (ref 32–36)
MCV RBC AUTO: 94.6 FL — SIGNIFICANT CHANGE UP (ref 80–100)
MONOCYTES # BLD AUTO: 1.25 K/UL — HIGH (ref 0–0.9)
MONOCYTES NFR BLD AUTO: 9.3 % — SIGNIFICANT CHANGE UP (ref 2–14)
NEUTROPHILS # BLD AUTO: 10.33 K/UL — HIGH (ref 1.8–7.4)
NEUTROPHILS NFR BLD AUTO: 76.7 % — SIGNIFICANT CHANGE UP (ref 43–77)
PLATELET # BLD AUTO: 291 K/UL — SIGNIFICANT CHANGE UP (ref 150–400)
POTASSIUM SERPL-MCNC: 3.8 MMOL/L — SIGNIFICANT CHANGE UP (ref 3.5–5.3)
POTASSIUM SERPL-SCNC: 3.8 MMOL/L — SIGNIFICANT CHANGE UP (ref 3.5–5.3)
PROT SERPL-MCNC: 7.7 GM/DL — SIGNIFICANT CHANGE UP (ref 6–8.3)
RBC # BLD: 4.79 M/UL — SIGNIFICANT CHANGE UP (ref 4.2–5.8)
RBC # FLD: 12.1 % — SIGNIFICANT CHANGE UP (ref 10.3–14.5)
SODIUM SERPL-SCNC: 137 MMOL/L — SIGNIFICANT CHANGE UP (ref 135–145)
WBC # BLD: 13.45 K/UL — HIGH (ref 3.8–10.5)
WBC # FLD AUTO: 13.45 K/UL — HIGH (ref 3.8–10.5)

## 2023-04-13 PROCEDURE — 71046 X-RAY EXAM CHEST 2 VIEWS: CPT | Mod: 26

## 2023-04-13 RX ORDER — LIDOCAINE 4 G/100G
1 CREAM TOPICAL ONCE
Refills: 0 | Status: COMPLETED | OUTPATIENT
Start: 2023-04-13 | End: 2023-04-13

## 2023-04-13 RX ORDER — GABAPENTIN 400 MG/1
300 CAPSULE ORAL EVERY 8 HOURS
Refills: 0 | Status: DISCONTINUED | OUTPATIENT
Start: 2023-04-13 | End: 2023-04-13

## 2023-04-13 RX ORDER — CHLORPROMAZINE HCL 10 MG
25 TABLET ORAL ONCE
Refills: 0 | Status: COMPLETED | OUTPATIENT
Start: 2023-04-13 | End: 2023-04-13

## 2023-04-13 RX ORDER — ACETAMINOPHEN 500 MG
650 TABLET ORAL ONCE
Refills: 0 | Status: COMPLETED | OUTPATIENT
Start: 2023-04-13 | End: 2023-04-13

## 2023-04-13 RX ORDER — KETOROLAC TROMETHAMINE 30 MG/ML
30 SYRINGE (ML) INJECTION ONCE
Refills: 0 | Status: DISCONTINUED | OUTPATIENT
Start: 2023-04-13 | End: 2023-04-13

## 2023-04-13 RX ADMIN — Medication 650 MILLIGRAM(S): at 00:53

## 2023-04-13 RX ADMIN — Medication 2 MILLIGRAM(S): at 00:53

## 2023-04-13 RX ADMIN — Medication 25 MILLIGRAM(S): at 01:19

## 2023-04-13 RX ADMIN — Medication 50 MILLIGRAM(S): at 01:18

## 2023-04-13 RX ADMIN — GABAPENTIN 300 MILLIGRAM(S): 400 CAPSULE ORAL at 00:53

## 2023-04-13 RX ADMIN — GABAPENTIN 300 MILLIGRAM(S): 400 CAPSULE ORAL at 09:01

## 2023-04-13 RX ADMIN — LIDOCAINE 1 PATCH: 4 CREAM TOPICAL at 00:53

## 2023-04-13 RX ADMIN — Medication 50 MILLIGRAM(S): at 12:20

## 2023-04-13 RX ADMIN — Medication 50 MILLIGRAM(S): at 07:26

## 2023-04-13 RX ADMIN — Medication 30 MILLIGRAM(S): at 00:55

## 2023-04-13 RX ADMIN — LIDOCAINE 1 PATCH: 4 CREAM TOPICAL at 07:17

## 2023-04-13 NOTE — ED ADULT NURSE NOTE - OBJECTIVE STATEMENT
27 y/o male awake alert and oriented x4 presents to ED c/o rib pain. Pt was seen in ED earlier today and told to have 3 nondisplaced rib fx. Pt was d/cd home with po medications. Pt was told to come back if pain worsens. Denies SOB, chest pain. Took 2 tyelnol and used some heat packs for pain at home without improvement. PMHx of ETOH abuse (approximately 10-20 beers daily)

## 2023-04-13 NOTE — CHART NOTE - NSCHARTNOTEFT_GEN_A_CORE
SW met with pt at bedside with assistance of language line. Pt presented to the ED for rib pain, however also was noted to have ETOH abuse. Pt reports drinking upwards of 10-12 beers daily. Pt has no signs or symptoms of detox at this time. Pt reported wanting treatment for ETOH abuse. SW explained signs and symptoms of withdrawal vs a rehab program. SW explained that they could assist in facilitating referrals to appropriate detox and inpatient rehab. 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 ALESIA then explored outpatient options with pt. Pt inquired what outpatient entails and if he would still be able to work. SW went over outpatient treatment facilities as well, which pt expressed interest in. SW provided pt with referral brochure in [place] for [facility] in [place]. Pt was then discharged and agreed to follow up with treatment.  Authored by: Jessica Cornejo, Intern

## 2023-04-13 NOTE — ED ADULT NURSE NOTE - DRUG PRE-SCREENING (DAST -1)
Consent (Spinal Accessory)/Introductory Paragraph: The rationale for Mohs was explained to the patient and consent was obtained. The risks, benefits and alternatives to therapy were discussed in detail. Specifically, the risks of damage to the spinal accessory nerve, infection, scarring, bleeding, prolonged wound healing, incomplete removal, allergy to anesthesia, and recurrence were addressed. Prior to the procedure, the treatment site was clearly identified and confirmed by the patient. All components of Universal Protocol/PAUSE Rule completed. Statement Selected

## 2025-07-18 ENCOUNTER — EMERGENCY (EMERGENCY)
Facility: HOSPITAL | Age: 30
LOS: 0 days | Discharge: HOME | End: 2025-07-18
Attending: STUDENT IN AN ORGANIZED HEALTH CARE EDUCATION/TRAINING PROGRAM
Payer: SELF-PAY

## 2025-07-18 VITALS
HEART RATE: 72 BPM | SYSTOLIC BLOOD PRESSURE: 130 MMHG | OXYGEN SATURATION: 98 % | RESPIRATION RATE: 20 BRPM | DIASTOLIC BLOOD PRESSURE: 70 MMHG | TEMPERATURE: 98 F

## 2025-07-18 VITALS
DIASTOLIC BLOOD PRESSURE: 79 MMHG | RESPIRATION RATE: 22 BRPM | WEIGHT: 132.72 LBS | HEART RATE: 74 BPM | OXYGEN SATURATION: 98 % | SYSTOLIC BLOOD PRESSURE: 136 MMHG | TEMPERATURE: 98 F

## 2025-07-18 DIAGNOSIS — K29.70 GASTRITIS, UNSPECIFIED, WITHOUT BLEEDING: ICD-10-CM

## 2025-07-18 DIAGNOSIS — R10.13 EPIGASTRIC PAIN: ICD-10-CM

## 2025-07-18 DIAGNOSIS — F10.90 ALCOHOL USE, UNSPECIFIED, UNCOMPLICATED: ICD-10-CM

## 2025-07-18 DIAGNOSIS — R11.2 NAUSEA WITH VOMITING, UNSPECIFIED: ICD-10-CM
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
## 2025-07-18 PROBLEM — F10.10 ALCOHOL ABUSE, UNCOMPLICATED: Chronic | Status: ACTIVE | Noted: 2023-04-15

## 2025-07-18 LAB
ALBUMIN SERPL ELPH-MCNC: 3.9 G/DL — SIGNIFICANT CHANGE UP (ref 3.3–5)
ALP SERPL-CCNC: 109 U/L — SIGNIFICANT CHANGE UP (ref 40–120)
ALT FLD-CCNC: 106 U/L — HIGH (ref 12–78)
ANION GAP SERPL CALC-SCNC: 5 MMOL/L — SIGNIFICANT CHANGE UP (ref 5–17)
APPEARANCE UR: CLEAR — SIGNIFICANT CHANGE UP
AST SERPL-CCNC: 163 U/L — HIGH (ref 15–37)
BASOPHILS # BLD AUTO: 0.06 K/UL — SIGNIFICANT CHANGE UP (ref 0–0.2)
BASOPHILS NFR BLD AUTO: 1 % — SIGNIFICANT CHANGE UP (ref 0–2)
BILIRUB SERPL-MCNC: 0.6 MG/DL — SIGNIFICANT CHANGE UP (ref 0.2–1.2)
BILIRUB UR-MCNC: NEGATIVE — SIGNIFICANT CHANGE UP
BUN SERPL-MCNC: 8 MG/DL — SIGNIFICANT CHANGE UP (ref 7–23)
CALCIUM SERPL-MCNC: 9.5 MG/DL — SIGNIFICANT CHANGE UP (ref 8.5–10.1)
CHLORIDE SERPL-SCNC: 100 MMOL/L — SIGNIFICANT CHANGE UP (ref 96–108)
CO2 SERPL-SCNC: 31 MMOL/L — SIGNIFICANT CHANGE UP (ref 22–31)
COLOR SPEC: YELLOW — SIGNIFICANT CHANGE UP
CREAT SERPL-MCNC: 0.66 MG/DL — SIGNIFICANT CHANGE UP (ref 0.5–1.3)
DIFF PNL FLD: NEGATIVE — SIGNIFICANT CHANGE UP
EGFR: 129 ML/MIN/1.73M2 — SIGNIFICANT CHANGE UP
EGFR: 129 ML/MIN/1.73M2 — SIGNIFICANT CHANGE UP
EOSINOPHIL # BLD AUTO: 0.08 K/UL — SIGNIFICANT CHANGE UP (ref 0–0.5)
EOSINOPHIL NFR BLD AUTO: 1.3 % — SIGNIFICANT CHANGE UP (ref 0–6)
ETHANOL SERPL-MCNC: 63 MG/DL — HIGH (ref 0–10)
GLUCOSE SERPL-MCNC: 98 MG/DL — SIGNIFICANT CHANGE UP (ref 70–99)
GLUCOSE UR QL: NEGATIVE MG/DL — SIGNIFICANT CHANGE UP
HCT VFR BLD CALC: 45.3 % — SIGNIFICANT CHANGE UP (ref 39–50)
HGB BLD-MCNC: 15.2 G/DL — SIGNIFICANT CHANGE UP (ref 13–17)
IMM GRANULOCYTES # BLD AUTO: 0.02 K/UL — SIGNIFICANT CHANGE UP (ref 0–0.07)
IMM GRANULOCYTES NFR BLD AUTO: 0.3 % — SIGNIFICANT CHANGE UP (ref 0–0.9)
KETONES UR QL: NEGATIVE MG/DL — SIGNIFICANT CHANGE UP
LEUKOCYTE ESTERASE UR-ACNC: NEGATIVE — SIGNIFICANT CHANGE UP
LIDOCAIN IGE QN: 26 U/L — SIGNIFICANT CHANGE UP (ref 13–75)
LYMPHOCYTES # BLD AUTO: 1.1 K/UL — SIGNIFICANT CHANGE UP (ref 1–3.3)
LYMPHOCYTES NFR BLD AUTO: 17.4 % — SIGNIFICANT CHANGE UP (ref 13–44)
MAGNESIUM SERPL-MCNC: 2.5 MG/DL — SIGNIFICANT CHANGE UP (ref 1.6–2.6)
MCHC RBC-ENTMCNC: 30.6 PG — SIGNIFICANT CHANGE UP (ref 27–34)
MCHC RBC-ENTMCNC: 33.6 G/DL — SIGNIFICANT CHANGE UP (ref 32–36)
MCV RBC AUTO: 91.1 FL — SIGNIFICANT CHANGE UP (ref 80–100)
MONOCYTES # BLD AUTO: 0.6 K/UL — SIGNIFICANT CHANGE UP (ref 0–0.9)
MONOCYTES NFR BLD AUTO: 9.5 % — SIGNIFICANT CHANGE UP (ref 2–14)
NEUTROPHILS # BLD AUTO: 4.45 K/UL — SIGNIFICANT CHANGE UP (ref 1.8–7.4)
NEUTROPHILS NFR BLD AUTO: 70.5 % — SIGNIFICANT CHANGE UP (ref 43–77)
NITRITE UR-MCNC: NEGATIVE — SIGNIFICANT CHANGE UP
NRBC # BLD AUTO: 0 K/UL — SIGNIFICANT CHANGE UP (ref 0–0)
NRBC # FLD: 0 K/UL — SIGNIFICANT CHANGE UP (ref 0–0)
NRBC BLD AUTO-RTO: 0 /100 WBCS — SIGNIFICANT CHANGE UP (ref 0–0)
PH UR: 5.5 — SIGNIFICANT CHANGE UP (ref 5–8)
PHOSPHATE SERPL-MCNC: 3.8 MG/DL — SIGNIFICANT CHANGE UP (ref 2.5–4.5)
PLATELET # BLD AUTO: 288 K/UL — SIGNIFICANT CHANGE UP (ref 150–400)
PMV BLD: 10.2 FL — SIGNIFICANT CHANGE UP (ref 7–13)
POTASSIUM SERPL-MCNC: 4.2 MMOL/L — SIGNIFICANT CHANGE UP (ref 3.5–5.3)
POTASSIUM SERPL-SCNC: 4.2 MMOL/L — SIGNIFICANT CHANGE UP (ref 3.5–5.3)
PROT SERPL-MCNC: 8.1 GM/DL — SIGNIFICANT CHANGE UP (ref 6–8.3)
PROT UR-MCNC: NEGATIVE MG/DL — SIGNIFICANT CHANGE UP
RBC # BLD: 4.97 M/UL — SIGNIFICANT CHANGE UP (ref 4.2–5.8)
RBC # FLD: 13.6 % — SIGNIFICANT CHANGE UP (ref 10.3–14.5)
SODIUM SERPL-SCNC: 136 MMOL/L — SIGNIFICANT CHANGE UP (ref 135–145)
SP GR SPEC: 1.01 — SIGNIFICANT CHANGE UP (ref 1–1.03)
UROBILINOGEN FLD QL: 0.2 MG/DL — SIGNIFICANT CHANGE UP (ref 0.2–1)
WBC # BLD: 6.31 K/UL — SIGNIFICANT CHANGE UP (ref 3.8–10.5)
WBC # FLD AUTO: 6.31 K/UL — SIGNIFICANT CHANGE UP (ref 3.8–10.5)

## 2025-07-18 PROCEDURE — 83690 ASSAY OF LIPASE: CPT

## 2025-07-18 PROCEDURE — 81003 URINALYSIS AUTO W/O SCOPE: CPT

## 2025-07-18 PROCEDURE — 99284 EMERGENCY DEPT VISIT MOD MDM: CPT | Mod: 25

## 2025-07-18 PROCEDURE — 84100 ASSAY OF PHOSPHORUS: CPT

## 2025-07-18 PROCEDURE — 80307 DRUG TEST PRSMV CHEM ANLYZR: CPT

## 2025-07-18 PROCEDURE — 83735 ASSAY OF MAGNESIUM: CPT

## 2025-07-18 PROCEDURE — 80053 COMPREHEN METABOLIC PANEL: CPT

## 2025-07-18 PROCEDURE — 99284 EMERGENCY DEPT VISIT MOD MDM: CPT

## 2025-07-18 PROCEDURE — 36415 COLL VENOUS BLD VENIPUNCTURE: CPT

## 2025-07-18 PROCEDURE — 85025 COMPLETE CBC W/AUTO DIFF WBC: CPT

## 2025-07-18 PROCEDURE — 96375 TX/PRO/DX INJ NEW DRUG ADDON: CPT

## 2025-07-18 PROCEDURE — 96374 THER/PROPH/DIAG INJ IV PUSH: CPT

## 2025-07-18 RX ORDER — B1/B2/B3/B5/B6/B12/VIT C/FOLIC 500-0.5 MG
1 TABLET ORAL ONCE
Refills: 0 | Status: DISCONTINUED | OUTPATIENT
Start: 2025-07-18 | End: 2025-07-18

## 2025-07-18 RX ORDER — PROCHLORPERAZINE 25 MG
10 SUPPOSITORY, RECTAL RECTAL ONCE
Refills: 0 | Status: COMPLETED | OUTPATIENT
Start: 2025-07-18 | End: 2025-07-18

## 2025-07-18 RX ORDER — PROCHLORPERAZINE 25 MG
1 SUPPOSITORY, RECTAL RECTAL
Qty: 14 | Refills: 0
Start: 2025-07-18 | End: 2025-07-24

## 2025-07-18 RX ORDER — ONDANSETRON HCL/PF 4 MG/2 ML
4 VIAL (ML) INJECTION ONCE
Refills: 0 | Status: COMPLETED | OUTPATIENT
Start: 2025-07-18 | End: 2025-07-18

## 2025-07-18 RX ORDER — B1/B2/B3/B5/B6/B12/VIT C/FOLIC 500-0.5 MG
1 TABLET ORAL ONCE
Refills: 0 | Status: COMPLETED | OUTPATIENT
Start: 2025-07-18 | End: 2025-07-18

## 2025-07-18 RX ORDER — MAGNESIUM, ALUMINUM HYDROXIDE 200-200 MG
30 TABLET,CHEWABLE ORAL ONCE
Refills: 0 | Status: COMPLETED | OUTPATIENT
Start: 2025-07-18 | End: 2025-07-18

## 2025-07-18 RX ADMIN — Medication 1 TABLET(S): at 14:15

## 2025-07-18 RX ADMIN — Medication 10 MILLIGRAM(S): at 14:43

## 2025-07-18 RX ADMIN — Medication 20 MILLIGRAM(S): at 14:00

## 2025-07-18 RX ADMIN — Medication 100 MILLIGRAM(S): at 14:00

## 2025-07-18 RX ADMIN — Medication 4 MILLIGRAM(S): at 14:00

## 2025-07-18 RX ADMIN — Medication 30 MILLILITER(S): at 13:59

## 2025-07-18 RX ADMIN — Medication 1000 MILLILITER(S): at 14:00

## 2025-07-18 NOTE — ED STATDOCS - OBJECTIVE STATEMENT
29 y/o male with PMHx of ETOH abuse presents to the ED c/o abdominal pain. Pt states he has hiccups when eating and feels food does not sit well in his stomach. Endorses having to vomit up food for improvement in symptoms. Pt states he drinks 8-10 beers a day. Pt states he has been in ED previously for similar symptoms.    #[id number] 31 y/o male with PMHx of ETOH abuse presents to the ED c/o abdominal pain. Pt states he has hiccups when eating and feels food does not sit well in his stomach. Endorses having to vomit up food for improvement in symptoms. Pt states he drinks 8-10 beers a day. Pt states he has been in ED previously for similar symptoms. Last drank this morning. Denying history of alcohol withdrawal.   #[id number]

## 2025-07-18 NOTE — ED STATDOCS - PHYSICAL EXAMINATION
General: alert, oriented to person, time, place  Psych: mood appropriate  Head: normocephalic; atraumatic  Eyes: conjunctivae clear bilaterally, sclerae anicteric  ENT: no nasal flaring, patent nares  Cardio: RRR, no m/r/g, pulses 2+ b/l  Resp: CATB, no w/r/r  GI: soft/nondistended/epigastric tenderness to palpation  Neuro: normal sensation, moving all four extremities equally  Skin: No evidence of rash or bruising  MSK: normal movement of all extremities  Lymph/Vasc: no LE edema

## 2025-07-18 NOTE — ED PROVIDER NOTE - PHYSICAL EXAMINATION
GEN: Patient awake alert NAD, actively hiccupping.  HEENT: normocephalic, atraumatic, EOMI, no scleral icterus, moist MM  CARDIAC: RRR, S1, S2, no murmur.   PULM: CTA B/L no wheeze, rhonchi, rales.   ABD: soft NT, ND, no rebound no guarding, no CVA tenderness.   MSK: Moving all extremities, no edema. 5/5 strength and full ROM in all extremities.     NEURO: A&Ox3, gait normal, no focal neurological deficits, CN 2-12 grossly intact  SKIN: warm, dry, no rash.

## 2025-07-18 NOTE — ED PROVIDER NOTE - OBJECTIVE STATEMENT
29 y/o male with HX of alcoholism presents to ED with abdominal pain for nausea and vomiting for one week and epigastric abdominal pain for the past month. States he has approximately 10 drinks a day.

## 2025-07-18 NOTE — ED STATDOCS - ATTENDING APP SHARED VISIT CONTRIBUTION OF CARE
I have personally performed a face to face bedside history and physical examination of this patient. I have discussed the history, examination, review of systems, assessment and plan of management with the advanced clinical provider. I personally authored the medical record in conjunction with the scribe and reviewed any progress notes or discharge instructions added by the advanced clinical provider.    Please see HPI, PE, and MDM for my personal documentation.

## 2025-07-18 NOTE — ED ADULT NURSE NOTE - OBJECTIVE STATEMENT
pt presents to the ED with abdominal pain and nausea. denies recent vomiting. denies diarrhea or constipation. reports daily drinking stating he drinks about 8-10 beers/day. pt denies fevers. no other complaints or discomforts reported at this time

## 2025-07-18 NOTE — ED STATDOCS - CLINICAL SUMMARY MEDICAL DECISION MAKING FREE TEXT BOX
Concern for alcoholic gastritis vs pancreatitis. Will check lipase, give antacid, give vitamin therapy. Plan to PO challenge after medication.

## 2025-07-18 NOTE — ED PROVIDER NOTE - CLINICAL SUMMARY MEDICAL DECISION MAKING FREE TEXT BOX
31 y/o alcoholic presenting for months of epigastric pain and nausea and vomiting, no abdominal tenderness on exam, will treat with Gi cocktail, Compazine, Pepcid, will DC with instructions. Likely Alcoholic Gastritis or Hepatitis, consult about alcohol use done, will discharge with DC with Pepcid and Compazine. 29 y/o alcoholic presenting for months of epigastric pain and nausea and vomiting, no abdominal tenderness on exam, will treat with Gi cocktail, Compazine, Pepcid, will DC with instructions. Likely Alcoholic Gastritis or Hepatitis, counseled about alcohol use done, will discharge with DC with Pepcid and Compazine.

## 2025-07-18 NOTE — ED ADULT TRIAGE NOTE - CHIEF COMPLAINT QUOTE
Pt ambulatory to the ED with c/o abdominal pain x months worsening today. . Pt endorses N/V, Pt is a daily drinker and feel this may be contributing to his pain. Last drink this morning. PMH gastritis.

## 2025-07-18 NOTE — ED ADULT NURSE REASSESSMENT NOTE - NS ED NURSE REASSESS COMMENT FT1
Received pt from [email or web address]. See CIWA score in flowsheet. States his last drink was approx 2 hours ago (2 beers). Pt endorses abdominal discomfort and nausea. Medicated as per MD order. Pt in no acute distress.

## 2025-07-18 NOTE — ED PROVIDER NOTE - PATIENT PORTAL LINK FT
You can access the FollowMyHealth Patient Portal offered by [facility] by registering at the following website: [email or web address]. By joining [facility]’s FollowMyHealth portal, you will also be able to view your health information using other applications (apps) compatible with our system.